# Patient Record
Sex: MALE | Race: WHITE | Employment: FULL TIME | URBAN - METROPOLITAN AREA
[De-identification: names, ages, dates, MRNs, and addresses within clinical notes are randomized per-mention and may not be internally consistent; named-entity substitution may affect disease eponyms.]

---

## 2018-03-05 ENCOUNTER — OFFICE VISIT (OUTPATIENT)
Dept: CARDIOLOGY CLINIC | Age: 33
End: 2018-03-05

## 2018-03-05 VITALS
HEIGHT: 70 IN | HEART RATE: 93 BPM | DIASTOLIC BLOOD PRESSURE: 70 MMHG | BODY MASS INDEX: 36.25 KG/M2 | OXYGEN SATURATION: 98 % | WEIGHT: 253.2 LBS | SYSTOLIC BLOOD PRESSURE: 120 MMHG

## 2018-03-05 DIAGNOSIS — G47.33 OSA (OBSTRUCTIVE SLEEP APNEA): ICD-10-CM

## 2018-03-05 DIAGNOSIS — R07.89 CHEST TIGHTNESS: ICD-10-CM

## 2018-03-05 DIAGNOSIS — R94.31 ABNORMAL EKG: Primary | ICD-10-CM

## 2018-03-05 DIAGNOSIS — J45.909 UNCOMPLICATED ASTHMA, UNSPECIFIED ASTHMA SEVERITY, UNSPECIFIED WHETHER PERSISTENT: ICD-10-CM

## 2018-03-05 PROCEDURE — 99204 OFFICE O/P NEW MOD 45 MIN: CPT | Performed by: INTERNAL MEDICINE

## 2018-03-05 RX ORDER — IBUPROFEN 800 MG/1
800 TABLET ORAL EVERY 6 HOURS PRN
COMMUNITY

## 2018-03-05 RX ORDER — ALBUTEROL SULFATE 90 UG/1
2 AEROSOL, METERED RESPIRATORY (INHALATION) EVERY 6 HOURS PRN
COMMUNITY

## 2018-03-05 RX ORDER — MONTELUKAST SODIUM 10 MG/1
10 TABLET ORAL NIGHTLY
COMMUNITY

## 2018-03-05 ASSESSMENT — ENCOUNTER SYMPTOMS
SHORTNESS OF BREATH: 0
BLOOD IN STOOL: 0
WHEEZING: 0
COUGH: 0
ABDOMINAL DISTENTION: 0
EYE REDNESS: 0

## 2018-03-05 NOTE — PROGRESS NOTES
Subjective:      Patient ID: Mirtha Garcia is a 34 y.o. male. Reason for visit: abnormal EKG  CC: \"My EKG was abnormal\"    HPI Mirtha Garcia is here for initial consultation at the request of WILLIAM Burris NP for evaluation of abnormal EKG. He saw his NP for sinus congestion, body aches and racing heart beat. Previous EKG at NP's office was abnormal per pt. He has a hx of asthma and PEACE. He was recently treated for GERD with improvement in his symptoms. He was then seen in the ED 2 days ago for atypical chest pain and was told that his EKG was normal. He states that his chest discomfort was due to his \"asthma flare up. He also states that his heart was beating fast but he had just used his inhaler. His chest discomfort is described as a tightness. Today, he denies exertional chest pain, recurrent chest tightness, recurrent palpitations, dizziness, syncope, leg swelling and increased dyspnea. Review of Systems   Constitutional: Negative for activity change, appetite change, chills, fatigue, fever and unexpected weight change. HENT: Negative for congestion, nosebleeds and tinnitus. Eyes: Negative for redness and visual disturbance. Respiratory: Negative for cough, shortness of breath and wheezing. Cardiovascular: Negative for chest pain, palpitations and leg swelling. Gastrointestinal: Negative for abdominal distention and blood in stool. Genitourinary: Negative for dysuria and hematuria. Musculoskeletal: Negative for gait problem and myalgias. Neurological: Negative for dizziness and speech difficulty. Hematological: Does not bruise/bleed easily. Psychiatric/Behavioral: Negative for behavioral problems and confusion. All other systems reviewed and are negative. Objective:   Physical Exam   Constitutional: He is oriented to person, place, and time. He appears well-developed and well-nourished. obese   HENT:   Head: Normocephalic and atraumatic.    Eyes: Conjunctivae and EOM are normal.   Neck: Normal range of motion. Neck supple. Cardiovascular: Normal rate, regular rhythm, S1 normal, S2 normal and normal heart sounds. Exam reveals no gallop. No murmur heard. Pulmonary/Chest: Effort normal and breath sounds normal.   Abdominal: Soft. Bowel sounds are normal.   Musculoskeletal: Normal range of motion. Neurological: He is alert and oriented to person, place, and time. Skin: Skin is warm and dry. Psychiatric: He has a normal mood and affect. His behavior is normal.   Nursing note and vitals reviewed. Blood pressure 120/70, pulse 93, height 5' 10\" (1.778 m), weight 253 lb 3.2 oz (114.9 kg), SpO2 98 %. Vitals:    03/05/18 1506   BP: 120/70   Site: Right Arm   Position: Sitting   Cuff Size: Medium Adult   Pulse: 93   SpO2: 98%   Weight: 253 lb 3.2 oz (114.9 kg)   Height: 5' 10\" (1.778 m)     Body mass index is 36.33 kg/m². Wt Readings from Last 3 Encounters:   03/05/18 253 lb 3.2 oz (114.9 kg)     BP Readings from Last 3 Encounters:   03/05/18 120/70        Current Outpatient Prescriptions   Medication Sig Dispense Refill    albuterol sulfate  (90 Base) MCG/ACT inhaler Inhale 2 puffs into the lungs every 6 hours as needed for Wheezing      montelukast (SINGULAIR) 10 MG tablet Take 10 mg by mouth nightly      Acetaminophen-Codeine (TYLENOL WITH CODEINE #3 PO) Take by mouth      ibuprofen (ADVIL;MOTRIN) 800 MG tablet Take 800 mg by mouth every 6 hours as needed for Pain       No current facility-administered medications for this visit.       Social History     Social History    Marital status: N/A     Spouse name: N/A    Number of children: N/A    Years of education: N/A     Social History Main Topics    Smoking status: Never Smoker    Smokeless tobacco: Never Used    Alcohol use No    Drug use: No    Sexual activity: Not Asked     Other Topics Concern    None     Social History Narrative    None     Past Surgical History:   Procedure Laterality Date   

## 2018-03-07 NOTE — COMMUNICATION BODY
HPI Griselda Apple is here for initial consultation at the request of WILLIAM Meléndez NP for evaluation of abnormal EKG. He saw his NP for sinus congestion, body aches and racing heart beat. Previous EKG at NP's office was abnormal per pt. He has a hx of asthma and PEACE. He was recently treated for GERD with improvement in his symptoms. He was then seen in the ED 2 days ago for atypical chest pain and was told that his EKG was normal. He states that his chest discomfort was due to his \"asthma flare up. He also states that his heart was beating fast but he had just used his inhaler. His chest discomfort is described as a tightness. Today, he denies exertional chest pain, recurrent chest tightness, recurrent palpitations, dizziness, syncope, leg swelling and increased dyspnea. Assessment:       Abnormal EKG. EKG 2/19/2018 NSR, normal EKG. EKG 2/21/18 NSR, normal EKG.  Asthma. Hx     Obesity/PEACE- using CPAP.  Chest tightness. Likely secondary to asthma. Resolved. Plan:      EKG's at [facility] showed NSR, no ischemic changes. Symptoms resolved with treatment of asthma. No clinical evidence of CHF. Recommend plain graded exercise stress test to exclude ischemia.